# Patient Record
Sex: FEMALE
[De-identification: names, ages, dates, MRNs, and addresses within clinical notes are randomized per-mention and may not be internally consistent; named-entity substitution may affect disease eponyms.]

---

## 2023-06-05 ENCOUNTER — LABORATORY RESULT (OUTPATIENT)
Age: 23
End: 2023-06-05

## 2023-06-05 ENCOUNTER — APPOINTMENT (OUTPATIENT)
Dept: OBGYN | Facility: CLINIC | Age: 23
End: 2023-06-05
Payer: COMMERCIAL

## 2023-06-05 ENCOUNTER — NON-APPOINTMENT (OUTPATIENT)
Age: 23
End: 2023-06-05

## 2023-06-05 VITALS
DIASTOLIC BLOOD PRESSURE: 78 MMHG | WEIGHT: 145 LBS | SYSTOLIC BLOOD PRESSURE: 118 MMHG | HEIGHT: 65 IN | HEART RATE: 65 BPM | BODY MASS INDEX: 24.16 KG/M2

## 2023-06-05 DIAGNOSIS — N63.10 UNSPECIFIED LUMP IN THE RIGHT BREAST, UNSPECIFIED QUADRANT: ICD-10-CM

## 2023-06-05 DIAGNOSIS — E28.2 POLYCYSTIC OVARIAN SYNDROME: ICD-10-CM

## 2023-06-05 DIAGNOSIS — Z83.3 FAMILY HISTORY OF DIABETES MELLITUS: ICD-10-CM

## 2023-06-05 DIAGNOSIS — Z11.3 ENCOUNTER FOR SCREENING FOR INFECTIONS WITH A PREDOMINANTLY SEXUAL MODE OF TRANSMISSION: ICD-10-CM

## 2023-06-05 DIAGNOSIS — E55.9 VITAMIN D DEFICIENCY, UNSPECIFIED: ICD-10-CM

## 2023-06-05 DIAGNOSIS — Z78.9 OTHER SPECIFIED HEALTH STATUS: ICD-10-CM

## 2023-06-05 PROBLEM — Z00.00 ENCOUNTER FOR PREVENTIVE HEALTH EXAMINATION: Status: ACTIVE | Noted: 2023-06-05

## 2023-06-05 PROCEDURE — 99203 OFFICE O/P NEW LOW 30 MIN: CPT

## 2023-06-05 RX ORDER — CLONAZEPAM 0.5 MG/1
0.5 TABLET ORAL
Refills: 0 | Status: ACTIVE | COMMUNITY

## 2023-06-05 NOTE — HISTORY OF PRESENT ILLNESS
[Patient reported PAP Smear was normal] : Patient reported PAP Smear was normal [LMP unknown] : LMP unknown [Oral Contraceptive] : uses oral contraception pills [Y] : Patient is sexually active [N] : Patient denies prior pregnancies [unknown] : Patient is unsure of the date of her LMP [Menarche Age: ____] : age at menarche was [unfilled] [FreeTextEntry1] : Patient presents for initial office visit. \par pt stopped ocp in 2021; prior to that menses fairly regular before 17yo. \par menses did not come for 5 months; went to gyn at [facility] and had blood work and was told testosterone was elevated testosterone and dheas were both elevated\par taking metformin 500mg bid and spironolactone\par on kip [PapSmeardate] : 09/2022

## 2023-06-30 ENCOUNTER — TRANSCRIPTION ENCOUNTER (OUTPATIENT)
Age: 23
End: 2023-06-30

## 2023-07-12 LAB
25(OH)D3 SERPL-MCNC: 44.3 NG/ML
C TRACH RRNA SPEC QL NAA+PROBE: NOT DETECTED
HBV SURFACE AG SER QL: NONREACTIVE
HCV RNA SERPL NAA+PROBE-LOG IU: NOT DETECTED LOGIU/ML
HEPC RNA INTERP: NOT DETECTED
N GONORRHOEA RRNA SPEC QL NAA+PROBE: NOT DETECTED
SOURCE AMPLIFICATION: NORMAL
T PALLIDUM AB SER QL IA: NEGATIVE

## 2023-09-01 ENCOUNTER — TRANSCRIPTION ENCOUNTER (OUTPATIENT)
Age: 23
End: 2023-09-01

## 2023-09-10 ENCOUNTER — TRANSCRIPTION ENCOUNTER (OUTPATIENT)
Age: 23
End: 2023-09-10

## 2024-01-29 RX ORDER — METFORMIN HYDROCHLORIDE 500 MG/1
500 TABLET, COATED ORAL
Qty: 60 | Refills: 5 | Status: ACTIVE | COMMUNITY
Start: 1900-01-01 | End: 1900-01-01

## 2024-02-27 ENCOUNTER — TRANSCRIPTION ENCOUNTER (OUTPATIENT)
Age: 24
End: 2024-02-27

## 2024-02-27 RX ORDER — DROSPIRENONE AND ETHINYL ESTRADIOL 0.02-3(28)
3-0.02 KIT ORAL DAILY
Qty: 3 | Refills: 2 | Status: ACTIVE | COMMUNITY
Start: 1900-01-01 | End: 1900-01-01

## 2024-02-28 ENCOUNTER — TRANSCRIPTION ENCOUNTER (OUTPATIENT)
Age: 24
End: 2024-02-28

## 2024-03-13 ENCOUNTER — RESULT REVIEW (OUTPATIENT)
Age: 24
End: 2024-03-13

## 2024-03-13 ENCOUNTER — APPOINTMENT (OUTPATIENT)
Dept: OBGYN | Facility: CLINIC | Age: 24
End: 2024-03-13
Payer: COMMERCIAL

## 2024-03-13 ENCOUNTER — LABORATORY RESULT (OUTPATIENT)
Age: 24
End: 2024-03-13

## 2024-03-13 VITALS
DIASTOLIC BLOOD PRESSURE: 84 MMHG | HEART RATE: 75 BPM | SYSTOLIC BLOOD PRESSURE: 118 MMHG | OXYGEN SATURATION: 98 % | WEIGHT: 140 LBS

## 2024-03-13 DIAGNOSIS — N60.11 DIFFUSE CYSTIC MASTOPATHY OF RIGHT BREAST: ICD-10-CM

## 2024-03-13 DIAGNOSIS — N63.20 UNSPECIFIED LUMP IN THE LEFT BREAST, UNSPECIFIED QUADRANT: ICD-10-CM

## 2024-03-13 PROCEDURE — 99213 OFFICE O/P EST LOW 20 MIN: CPT

## 2024-03-13 NOTE — PHYSICAL EXAM
[Appropriately responsive] : appropriately responsive [Examination Of The Breasts] : a normal appearance [Normal] : normal [___cm] : a ~M [unfilled] ~Ucm superior medial quadrant mass was palpated

## 2024-03-13 NOTE — HISTORY OF PRESENT ILLNESS
[FreeTextEntry1] : Alicia Leone presents for lump for left breast. h/o right side fibroadenoma; felt lump for few days/week also for std testing taking ocp's was told she had pcos after stopping ocp's; was told testosterone high

## 2024-03-19 LAB
C TRACH RRNA SPEC QL NAA+PROBE: NOT DETECTED
HBV SURFACE AG SER QL: NONREACTIVE
HCV RNA SERPL NAA+PROBE-LOG IU: NOT DETECTED LOGIU/ML
HEPC RNA INTERP: NOT DETECTED
N GONORRHOEA RRNA SPEC QL NAA+PROBE: NOT DETECTED
SOURCE AMPLIFICATION: NORMAL
T PALLIDUM AB SER QL IA: NEGATIVE

## 2024-03-20 ENCOUNTER — OUTPATIENT (OUTPATIENT)
Dept: OUTPATIENT SERVICES | Facility: HOSPITAL | Age: 24
LOS: 1 days | End: 2024-03-20

## 2024-03-20 ENCOUNTER — APPOINTMENT (OUTPATIENT)
Dept: ULTRASOUND IMAGING | Facility: CLINIC | Age: 24
End: 2024-03-20
Payer: COMMERCIAL

## 2024-03-20 ENCOUNTER — RESULT REVIEW (OUTPATIENT)
Age: 24
End: 2024-03-20

## 2024-03-20 PROCEDURE — 76642 ULTRASOUND BREAST LIMITED: CPT | Mod: 26,50

## 2024-03-21 ENCOUNTER — NON-APPOINTMENT (OUTPATIENT)
Age: 24
End: 2024-03-21

## 2024-03-21 ENCOUNTER — TRANSCRIPTION ENCOUNTER (OUTPATIENT)
Age: 24
End: 2024-03-21

## 2024-03-27 ENCOUNTER — APPOINTMENT (OUTPATIENT)
Dept: BREAST CENTER | Facility: CLINIC | Age: 24
End: 2024-03-27
Payer: COMMERCIAL

## 2024-03-27 VITALS
OXYGEN SATURATION: 99 % | WEIGHT: 135 LBS | RESPIRATION RATE: 16 BRPM | HEIGHT: 65 IN | TEMPERATURE: 98.3 F | BODY MASS INDEX: 22.49 KG/M2 | SYSTOLIC BLOOD PRESSURE: 108 MMHG | DIASTOLIC BLOOD PRESSURE: 73 MMHG | HEART RATE: 83 BPM

## 2024-03-27 DIAGNOSIS — Z80.3 FAMILY HISTORY OF MALIGNANT NEOPLASM OF BREAST: ICD-10-CM

## 2024-03-27 DIAGNOSIS — R92.8 OTHER ABNORMAL AND INCONCLUSIVE FINDINGS ON DIAGNOSTIC IMAGING OF BREAST: ICD-10-CM

## 2024-03-27 DIAGNOSIS — R92.30 DENSE BREASTS, UNSPECIFIED: ICD-10-CM

## 2024-03-27 DIAGNOSIS — F19.90 OTHER PSYCHOACTIVE SUBSTANCE USE, UNSPECIFIED, UNCOMPLICATED: ICD-10-CM

## 2024-03-27 PROCEDURE — 99204 OFFICE O/P NEW MOD 45 MIN: CPT

## 2024-03-27 RX ORDER — DEXTROAMPHETAMINE SACCHARATE, AMPHETAMINE ASPARTATE MONOHYDRATE, DEXTROAMPHETAMINE SULFATE AND AMPHETAMINE SULFATE 3.75; 3.75; 3.75; 3.75 MG/1; MG/1; MG/1; MG/1
15 CAPSULE, EXTENDED RELEASE ORAL
Qty: 30 | Refills: 0 | Status: DISCONTINUED | COMMUNITY
Start: 2023-01-09 | End: 2024-03-27

## 2024-03-27 RX ORDER — CEPHALEXIN 500 MG/1
500 CAPSULE ORAL
Refills: 0 | Status: ACTIVE | COMMUNITY

## 2024-03-27 RX ORDER — DROSPIRENONE AND ETHINYL ESTRADIOL 0.02-3(28)
3-0.02 KIT ORAL
Refills: 0 | Status: ACTIVE | COMMUNITY

## 2024-03-27 RX ORDER — DEXTROAMPHETAMINE SACCHARATE, AMPHETAMINE ASPARTATE, DEXTROAMPHETAMINE SULFATE, AND AMPHETAMINE SULFATE 3.75; 3.75; 3.75; 3.75 MG/1; MG/1; MG/1; MG/1
TABLET ORAL
Refills: 0 | Status: ACTIVE | COMMUNITY

## 2024-03-27 NOTE — FAMILY HISTORY
[TextEntry] : Paternal grandmother, breast cancer, age 50 Maternal grandmother, lung cancer, 74 Denies family history of ovarian cancer, colon cancer, pancreatic cancer, prostate cancer, melanoma

## 2024-03-27 NOTE — REVIEW OF SYSTEMS
[As Noted in HPI] : as noted in HPI [Negative] : Heme/Lymph [FreeTextEntry8] : h/o irregular menstrual cycles, patient states was told she possibly has PCOS, under the care of gynecologist

## 2024-03-27 NOTE — PHYSICAL EXAM
[Normocephalic] : normocephalic [No Supraclavicular Adenopathy] : no supraclavicular adenopathy [Examined in the supine and seated position] : examined in the supine and seated position [No dominant masses] : no dominant masses in right breast  [No dominant masses] : no dominant masses left breast [No Nipple Retraction] : no left nipple retraction [No Nipple Discharge] : no left nipple discharge [No Axillary Lymphadenopathy] : no left axillary lymphadenopathy [No Edema] : no edema [No Swelling] : no swelling [No Rashes] : no rashes [No Ulceration] : no ulceration [de-identified] : Right breast 11:00 3 cm FN vague dense tissue no dominant masses (region of 1.8 cm benign-appearing solid nodule on US); UOQ glandular density  [de-identified] : UOQ glandular density, vague mobile dense island of tissue 12:00 4-5cmFN ~1cm, no dominant mass

## 2024-03-27 NOTE — PAST MEDICAL HISTORY
[Menstruating] : The patient is menstruating [Menarche Age ____] : age at menarche was [unfilled] [Definite ___ (Date)] : the last menstrual period was [unfilled] [Regular Cycle Intervals] : have been regular [Total Preg ___] : G[unfilled] [History of Hormone Replacement Treatment] : has no history of hormone replacement treatment [FreeTextEntry5] : NONE [FreeTextEntry6] : NONE [FreeTextEntry7] : ORAL CONTRACEPTIVE FOR THE PAST 6 YEARS [FreeTextEntry8] : NONE

## 2024-03-27 NOTE — HISTORY OF PRESENT ILLNESS
[FreeTextEntry1] : AFSHIN is a 23 year old female, referred by Dr.Louise Grant (OBGYN), who presents for initial evaluation regarding a palpable a palpable abnormality of the left breast in the 12:00 region 4cmFN, first noted by the patient about 2 to 3 weeks ago. The patient denies any changes to the palpable abnormality.  The patient also states a history of a right breast nodule 11:00 3 cm FN since 2018 states this nodule is not easily palpable. Patient underwent a bilateral targeted breast US on 3/20/2024 showing no suspicious finding corresponding to the region of palpable concern in the left breast 12:00 4 cm FN with dense underlying glandular tissue. In the right breast 11:00 3 cm FN there is a 1.8 cm benign-appearing solid nodule which is unchanged since 2018. There is an additional smaller benign-appearing solid nodule in the right breast 11:00 1 cm FN measuring 0.8 cm, not previously documented, recommended for follow-up. Denies skin changes/dimpling, no nipple discharge bilaterally.  Denies history of breast biopsy or breast surgery.  EZRA lifetime risk of 24.2%; family history of breast cancer in paternal grandmother diagnosed at age 50.   Of note the patient states that she possibly has PCOS..

## 2024-03-27 NOTE — ASSESSMENT
[FreeTextEntry1] : 23-year-old female, abnormal breast imaging, right breast nodule, family history of breast cancer, at high risk for breast cancer  Reviewed with the patient the clinical breast exam findings of her reported palpable abnormality in the left breast 12:00 4 cm FN, discussed with the patient clinical exam findings of dense glandular tissue in this region without dominant mass noted.  Reviewed the results of her bilateral breast US completed on 3/20/2024 discussed there are no suspicious findings in the region of palpable concern.  Discussed the findings of right breast nodule 11:00 3 cm FN measuring 1.8 cm which has been unchanged since 2018.  Reviewed the results of the new benign-appearing nodule in the right breast 11:00 1 cm FN measuring 0.8 cm recommended for 6-month follow-up targeted right breast US.  Discussed with the patient there are no dominant or suspicious findings on clinical breast exam; however, recommend the patient proceed with a send 3-month follow-up clinical breast exam for repeat evaluation of the left breast dense glandular tissue. Reviewed signs and symptoms of when to return to the office sooner.  Discussed breast awareness and self breast exams with the patient. Recommended simple pattern of palpation, while seated and while laying down. Recommended that she performs these breast exams at the same time every month, as to time it with her cycle. Discussed characteristics of a suspicious mass, such as irregular, hard like a rock and fixed/immobile. Patient understands.   Reviewed lifetime EZRA risk of 24.2%. Discussed with the patient the implications for their lifetime risk, which is considered to be at elevated risk to develop breast cancer over the span of their lifetime and it is recommended that they undergo high risk screening surveillance to include biannual radiological screening exams with a mammogram and screening MRI. Reviewed NCCN guidelines with the patient that include initiation of high risk screening breast MRI's to begin starting at 10 years prior to the youngest family member affected, not prior to age 25, or to begin at age 40, whichever comes first. Reviewed initiation of screening mammogram to begin 10 years younger than the youngest family member affected, not prior to age 30, or to begin at age 40, whichever comes first.  Discussed with the patient given her family history she would begin breast cancer screening imaging at age 40.  Discussed importance and implication of genetic testing in regards to their family history given paternal grandmother diagnosed with breast cancer at age 50. Reviewed NCCN guidelines in regards to genetic testing. Options to proceed with genetic counselor consult vs. testing in office was provided. Patient would like to think about genetic counseling/testing and will revisit at follow up visit.

## 2024-03-27 NOTE — DATA REVIEWED
[FreeTextEntry1] : 3/20/2024 ( [facility]) bilateral limited breast US: evaluation of the right breast demonstrates a stable benign-appearing solid nodule at 11:00 3 cm and the nipple measuring 1.8 x 1.8 cm, unchanged since 5/2018, indicative of benign etiology and consistent with a fibroadenoma. There is an additional smaller benign-appearing solid nodule at 11:00 1 cm from the nipple measuring 8 x 4 mm, not previously documented, which meets criteria for follow-up. Evaluation of the left breast demonstrates no suspicious finding corresponding to the region of palpable concern in the left breast 12:00 4 cm and the nipple with dense underlying glandular tissue. IMPRESSION: Recommend short-term sonographic follow-up of benign-appearing solid nodule in the right breast 11:00 1 cm from the nipple, to optimally document long-term stability. Ultrasound in 6 months. BI-RADS 3-Probably Benign Finding

## 2024-04-01 ENCOUNTER — APPOINTMENT (OUTPATIENT)
Dept: OBGYN | Facility: CLINIC | Age: 24
End: 2024-04-01

## 2024-04-04 ENCOUNTER — APPOINTMENT (OUTPATIENT)
Dept: OBGYN | Facility: CLINIC | Age: 24
End: 2024-04-04

## 2024-07-01 ENCOUNTER — LABORATORY RESULT (OUTPATIENT)
Age: 24
End: 2024-07-01

## 2024-07-01 ENCOUNTER — APPOINTMENT (OUTPATIENT)
Dept: OBGYN | Facility: CLINIC | Age: 24
End: 2024-07-01
Payer: COMMERCIAL

## 2024-07-01 VITALS
SYSTOLIC BLOOD PRESSURE: 136 MMHG | HEIGHT: 65 IN | BODY MASS INDEX: 22.49 KG/M2 | HEART RATE: 88 BPM | OXYGEN SATURATION: 99 % | WEIGHT: 135 LBS | DIASTOLIC BLOOD PRESSURE: 80 MMHG

## 2024-07-01 DIAGNOSIS — Z01.419 ENCOUNTER FOR GYNECOLOGICAL EXAMINATION (GENERAL) (ROUTINE) W/OUT ABNORMAL FINDINGS: ICD-10-CM

## 2024-07-01 DIAGNOSIS — N76.0 ACUTE VAGINITIS: ICD-10-CM

## 2024-07-01 PROCEDURE — 99395 PREV VISIT EST AGE 18-39: CPT

## 2024-07-01 RX ORDER — FLUCONAZOLE 150 MG/1
150 TABLET ORAL
Qty: 2 | Refills: 3 | Status: ACTIVE | COMMUNITY
Start: 2024-07-01 | End: 1900-01-01

## 2024-07-02 LAB
C TRACH RRNA SPEC QL NAA+PROBE: NOT DETECTED
HBV SURFACE AG SER QL: NONREACTIVE
HCV RNA SERPL NAA+PROBE-LOG IU: NOT DETECTED LOGIU/ML
HEPC RNA INTERP: NOT DETECTED
N GONORRHOEA RRNA SPEC QL NAA+PROBE: NOT DETECTED
SOURCE TP AMPLIFICATION: NORMAL
T PALLIDUM AB SER QL IA: NEGATIVE

## 2024-07-05 LAB
HSV 1+2 IGG SER IA-IMP: NEGATIVE
HSV 1+2 IGG SER IA-IMP: POSITIVE
HSV1 IGG SER QL: 38.7 INDEX
HSV2 IGG SER QL: 0.07 INDEX

## 2024-07-09 LAB
A VAGINAE DNA VAG QL NAA+PROBE: NORMAL
BVAB2 DNA VAG QL NAA+PROBE: NORMAL
C KRUSEI DNA VAG QL NAA+PROBE: NEGATIVE
C KRUSEI DNA VAG QL NAA+PROBE: POSITIVE
C TRACH RRNA SPEC QL NAA+PROBE: NEGATIVE
CANDIDA DNA VAG QL NAA+PROBE: NEGATIVE
MEGA1 DNA VAG QL NAA+PROBE: NORMAL
N GONORRHOEA RRNA SPEC QL NAA+PROBE: NEGATIVE
T VAGINALIS RRNA SPEC QL NAA+PROBE: NEGATIVE

## 2024-07-12 ENCOUNTER — TRANSCRIPTION ENCOUNTER (OUTPATIENT)
Age: 24
End: 2024-07-12

## 2024-07-12 DIAGNOSIS — B96.89 ACUTE VAGINITIS: ICD-10-CM

## 2024-07-12 DIAGNOSIS — N76.0 ACUTE VAGINITIS: ICD-10-CM

## 2024-07-12 RX ORDER — METRONIDAZOLE 500 MG/1
500 TABLET ORAL TWICE DAILY
Qty: 14 | Refills: 3 | Status: ACTIVE | COMMUNITY
Start: 2024-07-12 | End: 1900-01-01

## 2024-07-17 ENCOUNTER — TRANSCRIPTION ENCOUNTER (OUTPATIENT)
Age: 24
End: 2024-07-17

## 2024-07-17 LAB — CYTOLOGY CVX/VAG DOC THIN PREP: ABNORMAL

## 2024-08-06 ENCOUNTER — RX RENEWAL (OUTPATIENT)
Age: 24
End: 2024-08-06

## 2024-08-06 PROBLEM — Z30.41 ENCOUNTER FOR SURVEILLANCE OF CONTRACEPTIVE PILLS: Status: ACTIVE | Noted: 2024-08-06

## 2024-08-28 ENCOUNTER — APPOINTMENT (OUTPATIENT)
Dept: BREAST CENTER | Facility: CLINIC | Age: 24
End: 2024-08-28
Payer: COMMERCIAL

## 2024-08-28 ENCOUNTER — OUTPATIENT (OUTPATIENT)
Dept: OUTPATIENT SERVICES | Facility: HOSPITAL | Age: 24
LOS: 1 days | End: 2024-08-28

## 2024-08-28 ENCOUNTER — RESULT REVIEW (OUTPATIENT)
Age: 24
End: 2024-08-28

## 2024-08-28 ENCOUNTER — APPOINTMENT (OUTPATIENT)
Dept: ULTRASOUND IMAGING | Facility: CLINIC | Age: 24
End: 2024-08-28

## 2024-08-28 VITALS
DIASTOLIC BLOOD PRESSURE: 81 MMHG | TEMPERATURE: 97.4 F | WEIGHT: 135 LBS | OXYGEN SATURATION: 98 % | HEART RATE: 66 BPM | BODY MASS INDEX: 22.49 KG/M2 | RESPIRATION RATE: 16 BRPM | SYSTOLIC BLOOD PRESSURE: 117 MMHG | HEIGHT: 65 IN

## 2024-08-28 DIAGNOSIS — N63.10 UNSPECIFIED LUMP IN THE RIGHT BREAST, UNSPECIFIED QUADRANT: ICD-10-CM

## 2024-08-28 DIAGNOSIS — R92.8 OTHER ABNORMAL AND INCONCLUSIVE FINDINGS ON DIAGNOSTIC IMAGING OF BREAST: ICD-10-CM

## 2024-08-28 DIAGNOSIS — Z91.89 OTHER SPECIFIED PERSONAL RISK FACTORS, NOT ELSEWHERE CLASSIFIED: ICD-10-CM

## 2024-08-28 PROCEDURE — 99213 OFFICE O/P EST LOW 20 MIN: CPT

## 2024-08-28 PROCEDURE — 76642 ULTRASOUND BREAST LIMITED: CPT | Mod: 26,RT

## 2024-08-28 NOTE — HISTORY OF PRESENT ILLNESS
[FreeTextEntry1] : AFSHIN is a 24 year old female, who presents for follow up of a right breast nodule and palpable a palpable abnormality of the left breast in the 12:00 region 4cmFN, first noted by the patient March 2024. The patient denies any changes to the palpable abnormality.  The patient also states a history of a right breast nodule 11:00 3 cm FN since 2018 states this nodule is not easily palpable. Patient underwent a bilateral targeted breast US on 3/20/2024 showing no suspicious finding corresponding to the region of palpable concern in the left breast 12:00 4 cm FN with dense underlying glandular tissue. In the right breast 11:00 3 cm FN there is a 1.8 cm benign-appearing solid nodule which is unchanged since 2018. There is an additional smaller benign-appearing solid nodule in the right breast 11:00 1 cm FN measuring 0.8 cm, not previously documented, recommended for follow-up. Denies skin changes/dimpling, no nipple discharge bilaterally.  Denies history of breast biopsy or breast surgery.  EZRA lifetime risk of 24.2%; family history of breast cancer in paternal grandmother diagnosed at age 50.  Of note the patient states that she possibly has PCOS.

## 2024-08-28 NOTE — DATA REVIEWED
[FreeTextEntry1] : 3/20/2024 ([facility]) bilateral limited breast US: evaluation of the right breast demonstrates a stable benign-appearing solid nodule at 11:00 3 cm and the nipple measuring 1.8 x 1.8 cm, unchanged since 5/2018, indicative of benign etiology and consistent with a fibroadenoma. There is an additional smaller benign-appearing solid nodule at 11:00 1 cm from the nipple measuring 8 x 4 mm, not previously documented, which meets criteria for follow-up. Evaluation of the left breast demonstrates no suspicious finding corresponding to the region of palpable concern in the left breast 12:00 4 cm and the nipple with dense underlying glandular tissue. IMPRESSION: Recommend short-term sonographic follow-up of benign-appearing solid nodule in the right breast 11:00 1 cm from the nipple, to optimally document long-term stability. Ultrasound in 6 months. BI-RADS 3-Probably Benign Finding  8/28/24 ([facility]) right breast US: Evaluation of the right breast demonstrates a benign-appearing solid nodule at 11:00 3 cm in the nipple measuring 1.8 x 1.0 cm, diminished since 5/2019, indicative of benign etiology. There is an additional benign-appearing solid nodule at 11:00 1 cm from the nipple measuring 8 x 7 mm, stable since 3/2024 which additional follow-up can be performed. IMPRESSION: Recommend annual sonographic follow-up of a benign-appearing nodule in the right breast at 11:00 1 cm from the nipple, to optimally document two-year stability. Probably benign, US in one year, BIRADS3

## 2024-08-28 NOTE — REVIEW OF SYSTEMS
[FreeTextEntry8] : h/o irregular menstrual cycles, patient states was told she possibly has PCOS, under the care of gynecologist

## 2024-08-28 NOTE — DATA REVIEWED
[FreeTextEntry1] : 3/20/2024 ([facility]) bilateral limited breast US: evaluation of the right breast demonstrates a stable benign-appearing solid nodule at 11:00 3 cm and the nipple measuring 1.8 x 1.8 cm, unchanged since 5/2018, indicative of benign etiology and consistent with a fibroadenoma. There is an additional smaller benign-appearing solid nodule at 11:00 1 cm from the nipple measuring 8 x 4 mm, not previously documented, which meets criteria for follow-up. Evaluation of the left breast demonstrates no suspicious finding corresponding to the region of palpable concern in the left breast 12:00 4 cm and the nipple with dense underlying glandular tissue. IMPRESSION: Recommend short-term sonographic follow-up of benign-appearing solid nodule in the right breast 11:00 1 cm from the nipple, to optimally document long-term stability. Ultrasound in 6 months. BI-RADS 3-Probably Benign Finding  8/28/24 ([facility]) right breast US: Evaluation of the right breast demonstrates a benign-appearing solid nodule at 11:00 3 cm in the nipple measuring 1.8 x 1.0 cm, diminished since 5/2019, indicative of benign etiology. There is an additional benign-appearing solid nodule at 11:00 1 cm from the nipple measuring 8 x 7 mm, stable since 3/2024 which additional follow-up can be performed. IMPRESSION: Recommend annual sonographic follow-up of a benign-appearing nodule in the right breast at 11:00 1 cm from the nipple, to optimally document two-year stability. Probably benign, US in one year, BIRADS3

## 2024-08-28 NOTE — PLAN
[TextEntry] : targeted right breast US February/March 2025 Follow up February/March 2025 Will revisit genetic testing at follow up visit

## 2024-08-28 NOTE — PAST MEDICAL HISTORY
[Definite ___ (Date)] : the last menstrual period was [unfilled] [History of Hormone Replacement Treatment] : has no history of hormone replacement treatment [FreeTextEntry5] : NONE [FreeTextEntry6] : NONE [FreeTextEntry7] : ORAL CONTRACEPTIVE FOR THE PAST 6 YEARS [FreeTextEntry8] : NONE

## 2024-08-28 NOTE — PHYSICAL EXAM
[de-identified] : Right breast 11:00 3 cm FN mobile ~1cm nodule (region of 1.8 cm benign-appearing solid nodule on US); UOQ glandular density  [de-identified] : UOQ glandular density, vague mobile dense island of tissue 12:00 4-5cmFN ~1cm, unchanged no dominant mass

## 2024-08-28 NOTE — PHYSICAL EXAM
[de-identified] : Right breast 11:00 3 cm FN mobile ~1cm nodule (region of 1.8 cm benign-appearing solid nodule on US); UOQ glandular density  [de-identified] : UOQ glandular density, vague mobile dense island of tissue 12:00 4-5cmFN ~1cm, unchanged no dominant mass

## 2024-08-28 NOTE — ASSESSMENT
[FreeTextEntry1] : 24-year-old female, abnormal breast imaging, right breast nodule, family history of breast cancer, at high risk for breast cancer  #abnormal breast imaging, #right breast nodule Reviewed with the patient the clinical breast exam findings of her palpable abnormality in the left breast 12:00 4 cm FN, reviewed with the patient clinical exam findings of dense glandular tissue in this region, which has remained unchanged.  Reviewed results of targeted right breast US completed today showing stable right breast nodule 11:00 3cmFN and right breast 11:00 1 cm FN measuring 0.8 cm recommended, which has remained stable, recommended for 6 month follow-up targeted right breast US. Reviewed signs and symptoms of when to return to the office sooner.  #at high risk for breast cancer lifetime EZRA risk of 24.2%. Given her family history she would begin breast cancer screening imaging at age 39.  #family history of breast cancer Patient with a family history of breast cancer in paternal grandmother diagnosed at age 50, previously discussed importance and implication of genetic testing in regards to their family history Patient had previously stated she would like to think about genetic counseling/testing and will revisit at follow up visit.

## 2024-09-12 ENCOUNTER — TRANSCRIPTION ENCOUNTER (OUTPATIENT)
Age: 24
End: 2024-09-12

## 2024-10-28 ENCOUNTER — RX RENEWAL (OUTPATIENT)
Age: 24
End: 2024-10-28

## 2024-11-05 ENCOUNTER — TRANSCRIPTION ENCOUNTER (OUTPATIENT)
Age: 24
End: 2024-11-05

## 2025-03-15 ENCOUNTER — RESULT REVIEW (OUTPATIENT)
Age: 25
End: 2025-03-15

## 2025-03-15 ENCOUNTER — OUTPATIENT (OUTPATIENT)
Dept: OUTPATIENT SERVICES | Facility: HOSPITAL | Age: 25
LOS: 1 days | End: 2025-03-15

## 2025-03-15 ENCOUNTER — APPOINTMENT (OUTPATIENT)
Dept: ULTRASOUND IMAGING | Facility: CLINIC | Age: 25
End: 2025-03-15
Payer: COMMERCIAL

## 2025-03-15 PROCEDURE — 76642 ULTRASOUND BREAST LIMITED: CPT | Mod: 26,RT

## 2025-03-17 ENCOUNTER — TRANSCRIPTION ENCOUNTER (OUTPATIENT)
Age: 25
End: 2025-03-17

## 2025-03-17 ENCOUNTER — NON-APPOINTMENT (OUTPATIENT)
Age: 25
End: 2025-03-17

## 2025-03-28 ENCOUNTER — APPOINTMENT (OUTPATIENT)
Dept: BREAST CENTER | Facility: CLINIC | Age: 25
End: 2025-03-28
Payer: COMMERCIAL

## 2025-03-28 ENCOUNTER — NON-APPOINTMENT (OUTPATIENT)
Age: 25
End: 2025-03-28

## 2025-03-28 VITALS
HEART RATE: 100 BPM | DIASTOLIC BLOOD PRESSURE: 101 MMHG | BODY MASS INDEX: 22.49 KG/M2 | WEIGHT: 135 LBS | HEIGHT: 65 IN | OXYGEN SATURATION: 95 % | SYSTOLIC BLOOD PRESSURE: 165 MMHG

## 2025-03-28 VITALS
WEIGHT: 135 LBS | SYSTOLIC BLOOD PRESSURE: 122 MMHG | BODY MASS INDEX: 22.49 KG/M2 | OXYGEN SATURATION: 99 % | DIASTOLIC BLOOD PRESSURE: 86 MMHG | HEART RATE: 73 BPM | HEIGHT: 65 IN

## 2025-03-28 DIAGNOSIS — Z91.89 OTHER SPECIFIED PERSONAL RISK FACTORS, NOT ELSEWHERE CLASSIFIED: ICD-10-CM

## 2025-03-28 DIAGNOSIS — N63.10 UNSPECIFIED LUMP IN THE RIGHT BREAST, UNSPECIFIED QUADRANT: ICD-10-CM

## 2025-03-28 PROCEDURE — 99213 OFFICE O/P EST LOW 20 MIN: CPT

## 2025-06-18 ENCOUNTER — APPOINTMENT (OUTPATIENT)
Dept: HEMATOLOGY ONCOLOGY | Facility: CLINIC | Age: 25
End: 2025-06-18

## 2025-06-27 ENCOUNTER — NON-APPOINTMENT (OUTPATIENT)
Age: 25
End: 2025-06-27